# Patient Record
Sex: MALE | Race: WHITE | Employment: PART TIME | URBAN - METROPOLITAN AREA
[De-identification: names, ages, dates, MRNs, and addresses within clinical notes are randomized per-mention and may not be internally consistent; named-entity substitution may affect disease eponyms.]

---
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
## 2024-07-17 ENCOUNTER — HOSPITAL ENCOUNTER (OUTPATIENT)
Age: 24
Discharge: HOME | End: 2024-07-17
Payer: COMMERCIAL

## 2024-07-17 VITALS
OXYGEN SATURATION: 99 % | RESPIRATION RATE: 18 BRPM | DIASTOLIC BLOOD PRESSURE: 75 MMHG | TEMPERATURE: 99 F | SYSTOLIC BLOOD PRESSURE: 142 MMHG | HEART RATE: 84 BPM

## 2024-07-17 DIAGNOSIS — L73.9 FOLLICULITIS: Primary | ICD-10-CM

## 2024-07-17 PROCEDURE — 99203 OFFICE O/P NEW LOW 30 MIN: CPT | Performed by: NURSE PRACTITIONER

## 2024-07-17 RX ORDER — SULFAMETHOXAZOLE AND TRIMETHOPRIM 800; 160 MG/1; MG/1
1 TABLET ORAL 2 TIMES DAILY
Qty: 14 TABLET | Refills: 0 | Status: SHIPPED | OUTPATIENT
Start: 2024-07-17 | End: 2024-07-24

## 2024-07-17 NOTE — ED PROVIDER NOTES
Patient Seen in: [facility]      History     Chief Complaint   Patient presents with    Rash Skin Problem     Stated Complaint: rash/blisters on neck and face  Subjective:   HPI    Well-appearing 24-year-old male presents with a rash.  Patient states that rash to his chin and neck for the last few days.  He states that he initially thought it was razor burn but it has spread which prompted visit today.  No new lotions, products or detergents.    Objective:   No pertinent past medical history.          No pertinent past surgical history.            No pertinent social history.          Review of Systems   All other systems reviewed and are negative.      Positive for stated complaint: Rash Skin Problem    Other systems are as noted in HPI.  Constitutional and vital signs reviewed.      All other systems reviewed and negative except as noted above.    Physical Exam     ED Triage Vitals [07/17/24 1456]   /75   Pulse 84   Resp 18   Temp 98.8 °F (37.1 °C)   Temp src Temporal   SpO2 99 %   O2 Device None (Room air)     Current:/75   Pulse 84   Temp 98.8 °F (37.1 °C) (Temporal)   Resp 18   SpO2 99%     Physical Exam  Vitals and nursing note reviewed.   Constitutional:       General: He is awake. He is not in acute distress.     Appearance: Normal appearance. He is not ill-appearing, toxic-appearing or diaphoretic.   HENT:      Head: Normocephalic and atraumatic.      Right Ear: Tympanic membrane, ear canal and external ear normal.      Left Ear: Tympanic membrane, ear canal and external ear normal.      Nose: Nose normal.      Mouth/Throat:      Mouth: Mucous membranes are moist.      Pharynx: Oropharynx is clear. Uvula midline.   Eyes:      General: Lids are normal.      Extraocular Movements: Extraocular movements intact.      Conjunctiva/sclera: Conjunctivae normal.      Pupils: Pupils are equal, round, and reactive to light.   Cardiovascular:      Rate and Rhythm: Normal rate and regular  rhythm.      Pulses: Normal pulses.      Heart sounds: Normal heart sounds.   Pulmonary:      Effort: Pulmonary effort is normal.      Breath sounds: Normal breath sounds.   Skin:     General: Skin is warm and dry.      Capillary Refill: Capillary refill takes less than 2 seconds.      Comments: Erythematous, raised rash to the chin/beard    Neurological:      General: No focal deficit present.      Mental Status: He is alert and oriented to person, place, and time.   Psychiatric:         Mood and Affect: Mood normal.         Behavior: Behavior normal. Behavior is cooperative.         Thought Content: Thought content normal.         Judgment: Judgment normal.       ED Course     No results found.  Labs Reviewed - No data to display    MDM     Medical Decision Making  Differential diagnoses reflecting the complexity of care include but are not limited to folliculitis, dermatitis.    Comorbidities that add complexity to management include: N/A  History obtained by an independent source was from: N/A  Discussions of management was done with: N/A  My independent interpretations of studies include: N/A  Shared decision making was done by: N/A  Patient is well appearing, non-toxic and in no acute distress.  Vital signs are stable.  Examination symptoms consistent with a folliculitis.  Will treat with Bactrim and Bactroban.  Discussed with him no shaving, discard the razor that he last shaved with.  Close follow-up with PCP.  Patient verbalized plan of care and stated understanding    Problems Addressed:  Folliculitis: acute illness or injury    Amount and/or Complexity of Data Reviewed  ECG/medicine tests: ordered and independent interpretation performed. Decision-making details documented in ED Course.    Risk  OTC drugs.        Disposition and Plan     Clinical Impression:  1. Folliculitis         Disposition:  Discharge  7/17/2024  3:20 pm    Follow-up:  Primary Care Provider                Medications  Prescribed:  Discharge Medication List as of 7/17/2024  3:20 PM        START taking these medications    Details   mupirocin 2 % External Ointment Apply 1 Application topically 3 (three) times daily for 14 days., Normal, Disp-1 each, R-0      sulfamethoxazole-trimethoprim -160 MG Oral Tab per tablet Take 1 tablet by mouth 2 (two) times daily for 7 days., Normal, Disp-14 tablet, R-0                Note to patient: The 21[employer] act makes medical notes like these available to patients in the interest of transparency.  However, be advised this medical document and is intended as peer to peer communication.  It is read the medical language and may contain abbreviations or verbiage that are unfamiliar.  It may appear blunt or direct.  Medical documents are intended to carry relevant information, fax is evident and the clinical opinion of the practitioner.    This note was prepared using Dragon Medical voice recognition dictation software.  As a result, errors may occur.  When identified, these errors have been corrected.  While every attempt is made to correct errors during dictation, discrepancies may still exist.    Annamaria Butler, APRN  7/17/2024  3:18 PM

## 2024-07-24 ENCOUNTER — HOSPITAL ENCOUNTER (OUTPATIENT)
Age: 24
Discharge: HOME | End: 2024-07-24
Payer: COMMERCIAL

## 2024-07-24 VITALS
DIASTOLIC BLOOD PRESSURE: 72 MMHG | TEMPERATURE: 99 F | RESPIRATION RATE: 16 BRPM | OXYGEN SATURATION: 100 % | HEART RATE: 75 BPM | SYSTOLIC BLOOD PRESSURE: 131 MMHG

## 2024-07-24 DIAGNOSIS — R11.2 NAUSEA AND VOMITING IN ADULT: ICD-10-CM

## 2024-07-24 DIAGNOSIS — R50.9 FEVER: Primary | ICD-10-CM

## 2024-07-24 LAB
#MXD IC: 0.6 X10ˆ3/UL (ref 0.1–1)
BILIRUB UR QL STRIP: NEGATIVE
BUN BLD-MCNC: 7 MG/DL (ref 7–18)
CHLORIDE BLD-SCNC: 102 MMOL/L (ref 98–112)
CLARITY UR: CLEAR
CO2 BLD-SCNC: 27 MMOL/L (ref 21–32)
COLOR UR: YELLOW
CREAT BLD-MCNC: 1.3 MG/DL
EGFRCR SERPLBLD CKD-EPI 2021: 79 ML/MIN/1.73M2 (ref 60–?)
GLUCOSE BLD-MCNC: 90 MG/DL (ref 70–99)
GLUCOSE UR STRIP-MCNC: NEGATIVE MG/DL
HCT VFR BLD AUTO: 44 %
HCT VFR BLD CALC: 48 %
HGB BLD-MCNC: 14.9 G/DL
ISTAT IONIZED CALCIUM FOR CHEM 8: 1.2 MMOL/L (ref 1.12–1.32)
KETONES UR STRIP-MCNC: NEGATIVE MG/DL
LEUKOCYTE ESTERASE UR QL STRIP: NEGATIVE
LYMPHOCYTES # BLD AUTO: 0.7 X10ˆ3/UL (ref 1–4)
LYMPHOCYTES NFR BLD AUTO: 17.2 %
MCH RBC QN AUTO: 29.4 PG (ref 26–34)
MCHC RBC AUTO-ENTMCNC: 33.9 G/DL (ref 31–37)
MCV RBC AUTO: 87 FL (ref 80–100)
MIXED CELL %: 13.9 %
NEUTROPHILS # BLD AUTO: 3 X10ˆ3/UL (ref 1.5–7.7)
NEUTROPHILS NFR BLD AUTO: 68.9 %
NITRITE UR QL STRIP: NEGATIVE
PH UR STRIP: 7 [PH]
PLATELET # BLD AUTO: 163 X10ˆ3/UL (ref 150–450)
POCT INFLUENZA A: NEGATIVE
POCT INFLUENZA B: NEGATIVE
POTASSIUM BLD-SCNC: 4.5 MMOL/L (ref 3.6–5.1)
PROT UR STRIP-MCNC: NEGATIVE MG/DL
RBC # BLD AUTO: 5.06 X10ˆ6/UL
S PYO AG THROAT QL: NEGATIVE
SARS-COV-2 RNA RESP QL NAA+PROBE: NOT DETECTED
SODIUM BLD-SCNC: 137 MMOL/L (ref 136–145)
SP GR UR STRIP: 1.02
UROBILINOGEN UR STRIP-ACNC: <2 MG/DL
WBC # BLD AUTO: 4.3 X10ˆ3/UL (ref 4–11)

## 2024-07-24 PROCEDURE — 87502 INFLUENZA DNA AMP PROBE: CPT | Performed by: PHYSICIAN ASSISTANT

## 2024-07-24 PROCEDURE — 81002 URINALYSIS NONAUTO W/O SCOPE: CPT | Performed by: PHYSICIAN ASSISTANT

## 2024-07-24 PROCEDURE — 87880 STREP A ASSAY W/OPTIC: CPT | Performed by: PHYSICIAN ASSISTANT

## 2024-07-24 PROCEDURE — 80047 BASIC METABLC PNL IONIZED CA: CPT | Performed by: PHYSICIAN ASSISTANT

## 2024-07-24 PROCEDURE — 85025 COMPLETE CBC W/AUTO DIFF WBC: CPT | Performed by: PHYSICIAN ASSISTANT

## 2024-07-24 PROCEDURE — 99214 OFFICE O/P EST MOD 30 MIN: CPT | Performed by: PHYSICIAN ASSISTANT

## 2024-07-24 PROCEDURE — U0002 COVID-19 LAB TEST NON-CDC: HCPCS | Performed by: PHYSICIAN ASSISTANT

## 2024-07-24 PROCEDURE — S0119 ONDANSETRON 4 MG: HCPCS | Performed by: PHYSICIAN ASSISTANT

## 2024-07-24 RX ORDER — ONDANSETRON 4 MG/1
4 TABLET, ORALLY DISINTEGRATING ORAL ONCE
Status: COMPLETED | OUTPATIENT
Start: 2024-07-24 | End: 2024-07-24

## 2024-07-24 NOTE — DISCHARGE INSTRUCTIONS
Alternate Tylenol/Motrin every 3 hours as needed for pain or fever > 100.4 degrees  Drink plenty of fluids  Eat a bland diet   Follow up with primary care doctor     If you experience severe/worsening symptoms, inability to eat or drink, fever that cannot be controlled with tylenol/motrin, persistent vomiting, new/worsening rash, or any other concerning symptoms, go to nearest ED immediately

## 2024-07-24 NOTE — ED INITIAL ASSESSMENT (HPI)
Here 7/17, diagnosed w/ staph infection sent home w/ bactrim. Developed fever yesterday and vomited once this AM. Tmax 101.8. Taking tylenol, last dose today 1045.

## 2024-07-24 NOTE — ED PROVIDER NOTES
Chief Complaint   Patient presents with    Fever       History obtained from: patient   services not used     HPI:     Krunal Arroyo is a 24 year old male who presents with fever, nausea, and vomiting. Patient endorses fever and nausea starting yesterday and one episode of vomiting today. Tmax 101.8 this morning. Patient took Tylenol this morning with resolution of fever. Patient notes red spots around face and eyes today after vomiting which he states always happens when he vomits since he was a kid. Patient was seen in  last week on 7/17 for rash to chin and neck around beard. Patient was diagnosed with folliculitis and prescribed topical mupirocin and oral Bactrim which he has been taking as directed. Patient notes improvement in rash but rash is still present. Denies dizziness, lightheadedness, syncope, chest pain, shortness of breath, sore throat, nasal congestion, cough, neck stiffness, abdominal pain, oral lesions.     PMH  No past medical history on file.    PFSH    PFSH asessment screens reviewed and agree.  Nurses notes reviewed I agree with documentation.    No family history on file.  Family history reviewed with patient/caregiver and is not pertinent to presenting problem.  Social History     Socioeconomic History    Marital status: Single     Spouse name: Not on file    Number of children: Not on file    Years of education: Not on file    Highest education level: Not on file   Occupational History    Not on file   Tobacco Use    Smoking status: Not on file    Smokeless tobacco: Not on file   Substance and Sexual Activity    Alcohol use: Not on file    Drug use: Not on file    Sexual activity: Not on file   Other Topics Concern    Not on file   Social History Narrative    Not on file     Social Determinants of Health     Financial Resource Strain: Low Risk  (12/14/2023)    Received from [facility]    Overall Financial Resource Strain (CARDIA)      Difficulty of Paying Living Expenses: Not hard at all   Food Insecurity: No Food Insecurity (12/14/2023)    Received from [facility]    Hunger Vital Sign     Worried About Running Out of Food in the Last Year: Never true     Ran Out of Food in the Last Year: Never true   Transportation Needs: No Transportation Needs (12/14/2023)    Received from [facility]    PRAPARE - Transportation     Lack of Transportation (Medical): No     Lack of Transportation (Non-Medical): No   Physical Activity: Not on file   Stress: No Stress Concern Present (12/14/2023)    Received from [facility]    [employer] - Occupational Stress Questionnaire     Feeling of Stress : Only a little   Social Connections: Not on file   Housing Stability: Low Risk  (12/14/2023)    Received from [facility]    Housing Stability Vital Sign     Unable to Pay for Housing in the Last Year: No     Number of Places Lived in the Last Year: 1     In the last 12 months, was there a time when you did not have a steady place to sleep or slept in a shelter (including now)?: No         ROS:   Positive for stated complaint: fever, nausea, vomiting   All other systems reviewed and negative except as noted above.  Constitutional and Vital Signs Reviewed.    Physical Exam:     Findings:    /72   Pulse 75   Temp 98.6 °F (37 °C) (Oral)   Resp 16   SpO2 100%   GENERAL: well developed, no acute distress, non-toxic appearing   SKIN: good skin turgor, faint erythematous macules in various stages of healing to chin and neck within beard, no vesicles or blisters, faint petechiae to periorbital skin (per patient post-emesis), no purpura, no bleeding or drainage, no tenderness or swelling no rash visualized elsewhere    HEAD: normocephalic, atraumatic  EYES: sclera non-icteric bilaterally, conjunctiva clear  bilaterally  EARS: canals clear bilaterally, TMs clear bilaterally  NOSE: nasal turbinates pink, normal mucosa  OROPHARYNX: MMM, oropharynx clear, no lesions or rash, no exudates or swelling to pharynx, uvula midline, no tongue elevation, maintaining airway and secretions  NECK: supple, no lymphadenopathy, no nuchal rigidity, no trismus, no edema, phonation normal    CARDIO: RRR, normal heart sounds   LUNGS: clear to auscultation bilaterally, no increased WOB, no rales, rhonchi, or wheezes  GI: normoactive bowel sounds, abdomen soft and non-tender   EXTREMITIES: no cyanosis or edema, ALEJO without difficulty  NEURO: no focal deficits  PSYCH: alert and oriented x3, answering questions appropriately, mood appropriate    MDM/Assessment/Plan:   Orders for this encounter:    Orders Placed This Encounter    POCT CBC     Order Specific Question:   Release to patient     Answer:   Immediate    POCT Rapid Strep    POCT ISTAT chem8 cartridge    POCT Urinalysis Dipstick    Rapid SARS-CoV-2 by PCR     Order Specific Question:   Release to patient     Answer:   Immediate    POCT Flu Test     Order Specific Question:   Release to patient     Answer:   Immediate    POCT Rapid Strep    iStat (Chem 8)    POCT Urine Dip    ondansetron (Zofran-ODT) disintegrating tab 4 mg    mupirocin 2 % External Ointment     Sig: Apply 1 Application topically 3 (three) times daily for 14 days.     Dispense:  1 each     Refill:  0       Labs performed this visit:  Recent Results (from the past 10 hour(s))   Rapid SARS-CoV-2 by PCR    Collection Time: 07/24/24 12:38 PM    Specimen: Nares; Other   Result Value Ref Range    Rapid SARS-CoV-2 by PCR Not Detected Not Detected   POCT Flu Test    Collection Time: 07/24/24  1:04 PM    Specimen: Nares; Other   Result Value Ref Range    POCT INFLUENZA A Negative Negative    POCT INFLUENZA B Negative Negative   POCT Rapid Strep    Collection Time: 07/24/24  1:11 PM   Result Value Ref Range    POCT Rapid Strep  Negative Negative   POCT CBC    Collection Time: 07/24/24  1:36 PM   Result Value Ref Range    WBC IC 4.3 4.0 - 11.0 x10ˆ3/uL    RBC IC 5.06 4.30 - 5.70 X10ˆ6/uL    HGB IC 14.9 13.0 - 17.5 g/dL    HCT IC 44.0 39.0 - 53.0 %    MCV IC 87.0 80.0 - 100.0 fL    MCH IC 29.4 26.0 - 34.0 pg    MCHC IC 33.9 31.0 - 37.0 g/dL    PLT .0 150.0 - 450.0 X10ˆ3/uL    # Neutrophil 3.0 1.5 - 7.7 X10ˆ3/uL    # Lymphocyte 0.7 (L) 1.0 - 4.0 X10ˆ3/uL    # Mixed Cells 0.6 0.1 - 1.0 X10ˆ3/uL    Neutrophil % 68.9 %    Lymphocyte % 17.2 %    Mixed Cell % 13.9 %   POCT Urinalysis Dipstick    Collection Time: 07/24/24  1:39 PM   Result Value Ref Range    Urine Color Yellow Yellow    Urine Clarity Clear Clear    Specific Gravity, Urine 1.020 1.005 - 1.030    PH, Urine 7.0 5.0 - 8.0    Protein urine Negative Negative mg/dL    Glucose, Urine Negative Negative mg/dL    Ketone, Urine Negative Negative mg/dL    Bilirubin, Urine Negative Negative    Blood, Urine Moderate (A) Negative    Nitrite Urine Negative Negative    Urobilinogen urine <2.0 <2.0 mg/dL    Leukocyte esterase urine Negative Negative   POCT ISTAT chem8 cartridge    Collection Time: 07/24/24  1:41 PM   Result Value Ref Range    ISTAT Sodium 137 136 - 145 mmol/L    ISTAT BUN 7 7 - 18 mg/dL    ISTAT Potassium 4.5 3.6 - 5.1 mmol/L    ISTAT Chloride 102 98 - 112 mmol/L    ISTAT Ionized Calcium 1.20 1.12 - 1.32 mmol/L    ISTAT Hematocrit 48 37 - 53 %    ISTAT Glucose 90 70 - 99 mg/dL    ISTAT TCO2 27 21 - 32 mmol/L    ISTAT Creatinine 1.30 0.70 - 1.30 mg/dL    eGFR-Cr 79 >=60 mL/min/1.73m2       Imaging performed this visit:  No orders to display       Medical Decision Making  DDx includes viral illness versus COVID versus flu versus strep pharyngitis versus medication reaction versus other.  Patient is overall very well-appearing with stable vitals. No worsening of previous rash. No mucosal involvement. No abdominal tenderness.     COVID, flu, and strep test negative.  CBC reviewed,  grossly unremarkable without evidence of infection, anemia, or abnormal platelet count.  BMP reviewed, grossly unremarkable without evidence of electrolyte derangements or kidney dysfunction.  Urine dipstick analysis reviewed, notable for moderate blood however no signs of infection.    Patient given Zofran in IC with resolution of nausea.  Patient is tolerating oral intake and remains very well-appearing with stable vitals throughout IC stay. No vomiting or new/worsening symptoms throughout IC stay.  Low suspicion for septicemia given overall appearance, stable vitals, and no leukocytosis or leukopenia.  Recommend patient discontinue Bactrim however can continue topical mupirocin for folliculitis.  Discussed supportive care including rest, increase fluid intake, and OTC Tylenol/Motrin as needed for pain or fevers.  Instructed patient to go directly to nearest ER with any worsening or concerning symptoms.  Follow-up with PCP.    Discussed case with supervising attending Dr. Church who is in agreement with assessment and plan.    Amount and/or Complexity of Data Reviewed  External Data Reviewed: notes.  Labs: ordered.    Risk  OTC drugs.          Diagnosis:    ICD-10-CM    1. Fever  R50.9 Rapid SARS-CoV-2 by PCR     Rapid SARS-CoV-2 by PCR     POCT Flu Test     POCT Flu Test      2. Nausea and vomiting in adult  R11.2           All results reviewed and discussed with patient/patient's family. Patient/patient's family verbalize excellent understanding of instructions and feels comfortable with plan. All of patient's/patient's family's questions were addressed.   See AVS for detailed discharge instructions for your condition today.    Follow Up with:  Sal Flores MD  [address] [place] [address]  [phone]    Schedule an appointment as soon as possible for a visit   New primary care provider      Note: This document was dictated using Dragon medical dictation software.  Proofreading was performed  to the best of my ability, but errors may be present.    Alivia Hector PA-C

## 2025-07-31 ENCOUNTER — HOSPITAL ENCOUNTER (OUTPATIENT)
Age: 25
Discharge: HOME | End: 2025-07-31

## 2025-07-31 ENCOUNTER — APPOINTMENT (OUTPATIENT)
Dept: GENERAL RADIOLOGY | Age: 25
End: 2025-07-31

## 2025-07-31 VITALS
DIASTOLIC BLOOD PRESSURE: 80 MMHG | OXYGEN SATURATION: 98 % | RESPIRATION RATE: 18 BRPM | TEMPERATURE: 98 F | SYSTOLIC BLOOD PRESSURE: 122 MMHG | HEART RATE: 85 BPM

## 2025-07-31 DIAGNOSIS — J45.901 ASTHMA EXACERBATION, MILD (HCC): Primary | ICD-10-CM

## 2025-07-31 PROBLEM — J45.909 REACTIVE AIRWAY DISEASE (HCC): Status: ACTIVE | Noted: 2018-08-14

## 2025-07-31 PROBLEM — J30.9 ALLERGIC RHINITIS: Status: ACTIVE | Noted: 2018-08-14

## 2025-07-31 LAB — SARS-COV-2 RNA RESP QL NAA+PROBE: NOT DETECTED

## 2025-07-31 PROCEDURE — U0002 COVID-19 LAB TEST NON-CDC: HCPCS

## 2025-07-31 PROCEDURE — 99214 OFFICE O/P EST MOD 30 MIN: CPT

## 2025-07-31 PROCEDURE — 94640 AIRWAY INHALATION TREATMENT: CPT

## 2025-07-31 PROCEDURE — 71046 X-RAY EXAM CHEST 2 VIEWS: CPT

## 2025-07-31 RX ORDER — IPRATROPIUM BROMIDE AND ALBUTEROL SULFATE 2.5; .5 MG/3ML; MG/3ML
3 SOLUTION RESPIRATORY (INHALATION) ONCE
Status: COMPLETED | OUTPATIENT
Start: 2025-07-31 | End: 2025-07-31

## 2025-07-31 RX ORDER — ALBUTEROL SULFATE 90 UG/1
2 INHALANT RESPIRATORY (INHALATION) EVERY 4 HOURS PRN
Qty: 1 EACH | Refills: 0 | Status: SHIPPED | OUTPATIENT
Start: 2025-07-31 | End: 2025-08-30

## 2025-07-31 RX ORDER — PREDNISONE 20 MG/1
40 TABLET ORAL DAILY
Qty: 10 TABLET | Refills: 0 | Status: SHIPPED | OUTPATIENT
Start: 2025-07-31 | End: 2025-08-05